# Patient Record
Sex: MALE
[De-identification: names, ages, dates, MRNs, and addresses within clinical notes are randomized per-mention and may not be internally consistent; named-entity substitution may affect disease eponyms.]

---

## 2019-04-24 ENCOUNTER — HOSPITAL ENCOUNTER (EMERGENCY)
Dept: HOSPITAL 14 - H.ER | Age: 34
Discharge: HOME | End: 2019-04-24
Payer: COMMERCIAL

## 2019-04-24 VITALS
RESPIRATION RATE: 16 BRPM | TEMPERATURE: 98.4 F | SYSTOLIC BLOOD PRESSURE: 144 MMHG | HEART RATE: 94 BPM | DIASTOLIC BLOOD PRESSURE: 91 MMHG | OXYGEN SATURATION: 100 %
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
## 2019-04-24 DIAGNOSIS — S52.121A: Primary | ICD-10-CM
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
## 2019-04-24 NOTE — ED PDOC
Upper Extremity Pain/Injury


Time Seen by Provider: 19 22:11


Chief Complaint (Nursing): Upper Extremity Problem/Injury


Chief Complaint (Provider): Right Elbow Injury


History Per: Patient


History/Exam Limitations: no limitations


Onset/Duration Of Symptoms: Hrs (since 730pm today)


Current Symptoms Are (Timing): Still Present


Quality: Sharp


Pain Scale Rating Of: 4 (at rest)


Additional Complaint(s): 





Patient is a 33 year old male who presents to the ED for evaluation of a right 

elbow injury. Patient reports around 730pm tonight he was playing basketball 

when he was running, fell, and landed on an outstretched right arm. Patient 

reports localized pain to the elbow joint that worsens with movement. No 

previous elbow injury or surgery. Patient is right hand dominant. Took no 

medications PTA. Denies head injury or LOC. No other complaints at present. 

Denies recent fever or loss of sensation.





PMD: none





Past Medical History


Reviewed: Historical Data, Nursing Documentation, Vital Signs


Vital Signs: 





                                Last Vital Signs











Temp  98.4 F   19 22:02


 


Pulse  94 H  19 22:02


 


Resp  16   19 22:02


 


BP  144/91 H  19 22:02


 


Pulse Ox  100   19 22:02














- Medical History


PMH: No Chronic Diseases





- Surgical History


Surgical History: No Surg Hx





- Family History


Family History: States: Unknown Family Hx





- Social History


Current smoker - smoking cessation education provided: Yes (1-2/week)


Alcohol: Social


Drugs: Cannabis





- Home Medications


Home Medications: 


                                Ambulatory Orders











 Medication  Instructions  Recorded


 


Acetaminophen [Acetaminophen 8 650 mg PO Q8 PRN #21 tablet.er 19





Hour]  


 


Ibuprofen [Motrin Tab] 800 mg PO Q8 PRN #21 tab 19














- Allergies


Allergies/Adverse Reactions: 


                                    Allergies











Allergy/AdvReac Type Severity Reaction Status Date / Time


 


No Known Allergies Allergy   Verified 19 22:12














Review of Systems


ROS Statement: Except As Marked, All Systems Reviewed And Found Negative


Constitutional: Negative for: Fever


Musculoskeletal: Positive for: Other (right elbow pain)


Neurological: Negative for: Numbness





Physical Exam





- Reviewed


Nursing Documentation Reviewed: Yes


Vital Signs Reviewed: Yes





- Physical Exam


Comments: 


GENERALIZED APPEARANCE: Patient is awake, alert, oriented x3; resting 

comfortably in no acute distress.


VITAL SIGNS: Per nurse's note, reviewed by me.


SKIN: Warm, dry; (-) cyanosis.


NECK: Supple, FROM


ENT: Mucus membranes moist. Airway patent, (-) stridor. 


UPPER EXTREMITY: Arm held in slight flexion (-) palpable deformity. Mild 

tenderness of elbow over medial and lateral aspects (+) small joint effusion (-)

ecchymosis (-) erythema (-) skin break. (-) distal neurovascular 

deficit.Shoulder, wrist and hand: (-) tenderness, (-) limitation of motion. 

Sensation intact throughout. (+) pulses


CHEST AND RESPIRATORY: (-) rales, (-) rhonchi, (-) wheezes; breath sounds equal 

bilaterally. Respirations even and nonlabored. 


HEART AND CARDIOVASCULAR: (-) irregularity





- ECG


O2 Sat by Pulse Oximetry: 100 (RA)


Pulse Ox Interpretation: Normal





Medical Decision Making


Medical Decision Makin


Initial Impression: Acute elbow injury s/p fall


Plan:


-Toradol 30mg IM


-Elbow XR 3 views


-Re-evaluation








Elbow XR: (+) nondisplaced radial head fracture as read by Stacy ANDREWS





Patient placed in posterior long arm splint by ED danette Reich. Patient placed in 

sling and educated on splint care. NV intact after splint placement (+) distal 

pulses.





On re-evaluation, patient reports improvement of symptoms. On exam, patient 

remains AAOx3, in no acute distress. Vitals stable. 


Lab/Diagnostic results d/w the patient in great detail. Diagnosis of acute elbow

pain, radial head fracture d/w the patient. 


Based on history, exam and diagnostic results, plan will be for outpatient 

follow up with ortho. 


Patient instructed to follow-up with pmd / referral provided / the clinic  in 1-

2 days without fail. Advised to take medication as prescribed. Return to the 

emergency room at any time for any new or worsening symptoms. Patient states he 

fully agrees with and understands discharge instructions. States that he agrees 

with the plan and disposition. Verbalized and repeated discharge instructions 

and plan. I have given the patient opportunity to ask any additional questions.





Disposition





- Clinical Impression


Clinical Impression: 


 Elbow pain, Fall, Radial head fracture, closed








- Patient ED Disposition


Is Patient to be Admitted: No


Counseled Patient/Family Regarding: Studies Performed, Diagnosis, Need For 

Followup, Rx Given





- Disposition


Referrals: 


Thuan Rodriguez III, MD [Staff Provider] - 


Disposition: Routine/Home


Disposition Time: 23:10


Condition: STABLE


Additional Instructions: 


The emergency medical care you received today was directed at your acute 

symptoms. If you were prescribed any medication, please fill it and take as 

directed. It may take several days for your symptoms to resolve. Return to the 

Emergency Department if your symptoms worsen, do not improve, or if you have any

other problems.





Please contact your doctor in 2 days for re-evaluation and follow up / or call 

one of the physicians/clinics you have been referred to that are listed on the 

Patient Visit Information form that is included in your discharge packet. Bring 

any paperwork you were given at discharge with you along with any medications 

you are taking to your follow up visit. Our treatment cannot replace ongoing 

medical care by a primary care provider (PCP) outside of the emergency 

department.


Prescriptions: 


Acetaminophen [Acetaminophen 8 Hour] 650 mg PO Q8 PRN #21 tablet.er


 PRN Reason: Pain, Moderate (4-7)


Ibuprofen [Motrin Tab] 800 mg PO Q8 PRN #21 tab


 PRN Reason: Pain, Moderate (4-7)


Instructions:  Radius Fracture, Elbow Fracture (DC)


Forms:  [facility] (English), [facility] ED School/Work Excuse


Print Language: ENGLISH





- POA


Present On Arrival: Falls Or Trauma

## 2019-04-25 NOTE — RAD
Date of service: 



04/24/2019



PROCEDURE:  Radiographs of the right elbow.



HISTORY:

 r/o fracture, s/p fall 



COMPARISON:

No prior.



TECHNIQUE:

Four views obtained.



FINDINGS:



BONES:

There is a minimally displaced fracture of the radial head.  There is 

a joint effusion with elevation of the anterior and  posterior fat pad



JOINTS:

Normal. No osteoarthritis.



SOFT TISSUES:

Normal.



JOINT EFFUSION:

Joint effusion with elevation of the anterior posterior fat pad



OTHER FINDINGS:

None.



IMPRESSION:

There is a minimally displaced fracture of the radial head.  There is 

a joint effusion with elevation of the anterior and posterior fat pad